# Patient Record
Sex: MALE | Race: BLACK OR AFRICAN AMERICAN | Employment: FULL TIME | URBAN - METROPOLITAN AREA
[De-identification: names, ages, dates, MRNs, and addresses within clinical notes are randomized per-mention and may not be internally consistent; named-entity substitution may affect disease eponyms.]

---

## 2017-06-27 ENCOUNTER — HOSPITAL ENCOUNTER (EMERGENCY)
Age: 37
Discharge: HOME | End: 2017-06-27
Attending: INTERNAL MEDICINE
Payer: SELF-PAY

## 2017-06-27 VITALS
RESPIRATION RATE: 16 BRPM | HEART RATE: 83 BPM | WEIGHT: 315 LBS | TEMPERATURE: 98.5 F | OXYGEN SATURATION: 100 % | SYSTOLIC BLOOD PRESSURE: 146 MMHG | BODY MASS INDEX: 48.16 KG/M2 | DIASTOLIC BLOOD PRESSURE: 93 MMHG

## 2017-06-27 DIAGNOSIS — M76.61 ACHILLES TENDINITIS OF RIGHT LOWER EXTREMITY: Primary | ICD-10-CM

## 2017-06-27 PROCEDURE — 99282 EMERGENCY DEPT VISIT SF MDM: CPT

## 2017-06-27 NOTE — LETTER
NOTIFICATION RETURN TO WORK / SCHOOL 
 
6/27/2017 9:05 PM 
 
Mr. May Hines [address] [place] [id number] To Whom It May Concern: 
 
May Hines is currently under the care of [facility] EMERGENCY DEPT. He will return to work/school on: 06/28/17 If there are questions or concerns please have the patient contact our office.  
 
 
 
Sincerely, 
 
 
 
 
Enmanuel Melvin MD

## 2017-06-28 NOTE — ED NOTES
I have reviewed discharge instructions with patient. Patient verbalized understanding and has no further questions at this time. Education taught and patient verbalized understanding of education. Teach back method used. Patients pain 0/10. Belongings given to patient. Patient discharged with family to home.

## 2017-06-28 NOTE — ED PROVIDER NOTES
HPI Comments: 8:35 PM Josh Rabago is a 43 y.o. male with a hx of asthma who presents to the ED c/o R ankle pain X 3 years, but has progressively gotten worse this past week. Pt states that he tore his achilles heel tendon X 3 years ago. He was seen by an orthopedic surgeon and a x-ray showed that he needed a procedure to repair \"some broken fragments\". He never had the procedure due to insurance complications. Pt states that the pain is causing him difficulty at work. No other associated symptoms or modifying factors at this time. The history is provided by the patient. Past Medical History:   Diagnosis Date    Asthma        History reviewed. No pertinent surgical history. History reviewed. No pertinent family history. Social History     Social History    Marital status: SINGLE     Spouse name: N/A    Number of children: N/A    Years of education: N/A     Occupational History    Not on file. Social History Main Topics    Smoking status: Never Smoker    Smokeless tobacco: Not on file    Alcohol use Not on file    Drug use: Not on file    Sexual activity: Not on file     Other Topics Concern    Not on file     Social History Narrative         ALLERGIES: Review of patient's allergies indicates no known allergies. Review of Systems   Constitutional: Negative for chills and fever. HENT: Negative for congestion and rhinorrhea. Respiratory: Negative for cough and shortness of breath. Cardiovascular: Negative for chest pain and leg swelling. Gastrointestinal: Negative for abdominal pain and nausea. Genitourinary: Negative for dysuria and hematuria. Musculoskeletal: Positive for arthralgias. Negative for myalgias. Skin: Negative for rash and wound. Neurological: Negative for light-headedness and headaches. Psychiatric/Behavioral: Negative for confusion and hallucinations. All other systems reviewed and are negative.       Vitals:    06/27/17 2030   BP: (!) 146/93 Pulse: 83   Resp: 16   Temp: 98.5 °F (36.9 °C)   SpO2: 100%   Weight: (!) 165.6 kg (365 lb)            Physical Exam   Constitutional: He is oriented to person, place, and time. He appears well-developed and well-nourished. No distress. HENT:   Head: Normocephalic and atraumatic. Eyes: No scleral icterus. Cardiovascular: Normal rate. Pulmonary/Chest: Effort normal.   Musculoskeletal:        Right ankle: Tenderness. Coarse callus of the R achilles tendon. There is some tenderness. Normal calf strength. Normal pulse, motor, and sensory    Neurological: He is alert and oriented to person, place, and time. Skin: Skin is warm and dry. Psychiatric: He has a normal mood and affect. Nursing note and vitals reviewed. [facility]  ED Course       Procedures  Vitals:  Patient Vitals for the past 12 hrs:   Temp Pulse Resp BP SpO2   06/27/17 2030 98.5 °F (36.9 °C) 83 16 (!) 146/93 100 %         Medications ordered:   Medications - No data to display      Lab findings:  No results found for this or any previous visit (from the past 12 hour(s)). Progress notes, Consult notes or additional Procedure notes:   8:35 PM I have reassessed the patient and discussed their results and diagnosis. Pt will be discharged in stable condition. Patient is to return to emergency department if any new or worsening condition. Patient understands and verbalizes agreement with plan. Disposition:  Diagnosis:   1.  Achilles tendinitis of right lower extremity        Disposition: Discharged    Follow-up Information     Follow up With Details Comments [phone] Zenaida Gallegos MD Schedule an appointment as soon as possible for a visit Orthopedic follow up [address] [facility] [address].      [facility] EMERGENCY DEPT Go to As needed, If symptoms worsen [address]  [phone]           Patient's Medications   Start Taking    No medications on file   Continue Taking    NAPROXEN SODIUM (ANAPROX DS) 550 MG TABLET    Take 1 Tab by mouth two (2) times daily (with meals). PRN pain   These Medications have changed    No medications on file   Stop Taking    No medications on file     SCRIBE ATTESTATION STATEMENT  Documented by: Pita Meredith for, and in the presence of, James Silva MD 8:34 PM    Signed by: Aida Garza, 06/27/17 8:34 PM    PROVIDER ATTESTATION STATEMENT  I personally performed the services described in the documentation, reviewed the documentation, as recorded by the scribe in my presence, and it accurately and completely records my words and actions.   James Silva MD

## 2017-06-28 NOTE — DISCHARGE INSTRUCTIONS
Tendon Injury (Tendinopathy): Care Instructions  Your Care Instructions  Tendons are tough, flexible tissues that connect muscle to bone. A tendon can hurt or get torn from overuse or aging, especially tendons in the shoulder, elbow, wrist, hip, knee, or ankle. Tendon injuries may be called tendinopathy or tendinitis. Tendon injuries can occur from any motion you have to repeat in a job, sports, or daily activities. Tennis elbow is one common tendon injury. You can treat most tendon problems with over-the-counter pain medicine, rest, changes in your activities, and physical therapy. Follow-up care is a key part of your treatment and safety. Be sure to make and go to all appointments, and call your doctor if you are having problems. Its also a good idea to know your test results and keep a list of the medicines you take. How can you care for yourself at home? · Rest the sore area. You may have to stop doing the activity that caused the tendon pain for a while. · Take an over-the-counter pain medicine, such as acetaminophen (Tylenol), ibuprofen (Advil, Motrin), or naproxen (Aleve). Read and follow all instructions on the label. · Do not take two or more pain medicines at the same time unless the doctor told you to. Many pain medicines have acetaminophen, which is Tylenol. Too much acetaminophen (Tylenol) can be harmful. · Put ice or a cold pack on the sore area for 10 to 20 minutes at a time. Try to do this every 1 to 2 hours for the next 3 days (when you are awake) or until any swelling goes down. Put a thin cloth between the ice and your skin. · Prop up the sore area on a pillow when you ice it or anytime you sit or lie down during the next 3 days. Try to keep it above the level of your heart. This will help reduce swelling.   · Follow your doctor's advice for wearing and caring for a sling, splint, or cast. In some cases, you may wear one of these for a while to help your tendon heal.  · Follow your doctor's advice for stretching and physical therapy. Gently move your joint through its full range of motion. This will prevent stiffness in your joint. · Go back to your activity slowly. Warm up before and stretch after the activity. You also can try making some changes. For example, if a sport caused your tendon pain, alternate the sport with another activity. If using a tool causes pain, switch hands or change your . Stop the activity if it hurts. After the activity, apply ice to prevent pain and swelling. · Do not smoke. Smoking can slow healing. If you need help quitting, talk to your doctor about stop-smoking programs and medicines. These can increase your chances of quitting for good. When should you call for help? Watch closely for changes in your health, and be sure to contact your doctor if:  · Your pain gets worse. · You do not get better as expected. Where can you learn more? Go to http://radha-michelle.info/. Enter A157 in the search box to learn more about \"Tendon Injury (Tendinopathy): Care Instructions. \"  Current as of: March 21, 2017  Content Version: 11.3  © 4139-6161 "[employer]. Care instructions adapted under license by [employer] (which disclaims liability or warranty for this information). If you have questions about a medical condition or this instruction, always ask your healthcare professional. [address] any warranty or liability for your use of this information.

## 2019-04-01 ENCOUNTER — OFFICE VISIT (OUTPATIENT)
Dept: ORTHOPEDIC SURGERY | Age: 39
End: 2019-04-01

## 2019-04-01 VITALS
TEMPERATURE: 98.2 F | WEIGHT: 315 LBS | OXYGEN SATURATION: 98 % | SYSTOLIC BLOOD PRESSURE: 141 MMHG | BODY MASS INDEX: 41.75 KG/M2 | HEART RATE: 70 BPM | DIASTOLIC BLOOD PRESSURE: 85 MMHG | RESPIRATION RATE: 16 BRPM | HEIGHT: 73 IN

## 2019-04-01 DIAGNOSIS — M22.2X1 PATELLOFEMORAL DISORDER, RIGHT: Primary | ICD-10-CM

## 2019-04-01 PROBLEM — E66.01 OBESITY, MORBID (HCC): Status: ACTIVE | Noted: 2019-04-01

## 2019-04-01 RX ORDER — MELOXICAM 15 MG/1
TABLET ORAL
Qty: 90 TAB | Refills: 0 | Status: SHIPPED | OUTPATIENT
Start: 2019-04-01

## 2019-04-01 NOTE — PROGRESS NOTES
HISTORY OF PRESENT ILLNESS    Judge Quevedo is a 34y.o. year old male comes in today as new patient for: right knee pain    Patients symptoms have been present for 8+ years. Pain level 6/10 anterior, It has slightly improved with ER visits prior. Patient has tried:  Naproxen w/o benefit. It is described as pain and swelling in knee for years w/o known cause. IMAGING: XR right knee today minimal degen changes per my review. History reviewed. No pertinent surgical history. Social History     Socioeconomic History    Marital status: UNKNOWN     Spouse name: Not on file    Number of children: Not on file    Years of education: Not on file    Highest education level: Not on file   Tobacco Use    Smoking status: Never Smoker    Smokeless tobacco: Never Used      Current Outpatient Medications   Medication Sig Dispense Refill    naproxen sodium (ANAPROX DS) 550 mg tablet Take 1 Tab by mouth two (2) times daily (with meals). PRN pain 12 Tab 0     Past Medical History:   Diagnosis Date    Asthma      History reviewed. No pertinent family history. ROS:  Some swell, no bruise. , numb. All other systems reviewed and negative aside from that written in the HPI. Objective:  /85 (BP 1 Location: Left arm, BP Patient Position: Sitting)   Pulse 70   Temp 98.2 °F (36.8 °C) (Oral)   Resp 16   Ht 6' 1\" (1.854 m)   Wt (!) 372 lb 9.6 oz (169 kg)   SpO2 98%   BMI 49.16 kg/m²   GEN: Appears stated age in NAD. HEAD:  Normocephalic, atraumatic. NEURO:  Sensation intact light touch B/L lower extremities. MS:  LE Strength +5/5 bilateral .   right Knee:  1+ Effusion . Lachman negative. Valgus negative. Varus negative. negative joint line tenderness medial, lateral.  Romaine negative. Posterior drawer negative. Noble compression test negative. Patellar apprehension negative. Patellar facet  positive tender to palpation medial minimal crepitance right.   Pes anserine negative TTP right  EXT: no clubbing/cyanosis. no edema. SKIN: Warm/dry without rash. HEENT: Conjunctiva/lids WNL. External canals/nares WNL. Tongue midline. PERRL, EOMI. Hearing intact. NECK: Trachea midline. Supple, Full ROM. No thyromegaly. CARDIAC: No edema. LUNGS: Normal effort. ABD: Soft, no masses. No HSM. PSYCH: A+O x3. Appropriate judgment and insight. Assessment/Plan:   Diagnoses and all orders for this visit:    1. Patellofemoral disorder, right  -     XR KNEE RT MAX 2 VWS; Future  -     meloxicam (MOBIC) 15 mg tablet; Take 1 tab daily as needed pain with food. Patient (or guardian if minor) verbalizes understanding of evaluation and plan. Will start HEP as declines PT now and use mobic and ice PRn and RTC 6 weeks.

## 2020-07-13 ENCOUNTER — HOSPITAL ENCOUNTER (OUTPATIENT)
Dept: PHYSICAL THERAPY | Age: 40
Discharge: HOME | End: 2020-07-13
Payer: MEDICAID

## 2020-07-13 PROCEDURE — 97162 PT EVAL MOD COMPLEX 30 MIN: CPT

## 2020-07-13 NOTE — PROGRESS NOTES
PT DAILY TREATMENT NOTE 10-18    Patient Name: Bryson Burton  Date:2020  : 1980  [x]  Patient  Verified  Payor: [facility] MEDICAID / Plan: [facility] [address] [facility] CCCP / Product Type: Managed Care Medicaid /    In time:1:00  Out time:145  Total Treatment Time (min): 45  Visit #: 1 of     Medicare/BCBS Only   Total Timed Codes (min):  na 1:1 Treatment Time:  na       Treatment Area: Left wrist pain [M25.532]  Right knee pain [M25.561]  Right ankle pain [M25.571]    SUBJECTIVE  Pain Level (0-10 scale): 5  Any medication changes, allergies to medications, adverse drug reactions, diagnosis change, or new procedure performed?: [x] No    [] Yes (see summary sheet for update)  Subjective functional status/changes:   [] No changes reported  Pt IE today see POC for details    OBJECTIVE    Modality rationale: decrease inflammation and decrease pain to improve the patients ability to tolerate functional mobility   Min Type Additional Details    [] Estim:  []Unatt       []IFC  []Premod                        []Other:  []w/ice   []w/heat  Position:  Location:    [] Estim: []Att    []TENS instruct  []NMES                    []Other:  []w/US   []w/ice   []w/heat  Position:  Location:    []  Traction: [] Cervical       []Lumbar                       [] Prone          []Supine                       []Intermittent   []Continuous Lbs:  [] before manual  [] after manual    []  Ultrasound: []Continuous   [] Pulsed                           []1MHz   []3MHz W/cm2:  Location:    []  Iontophoresis with dexamethasone         Location: [] Take home patch   [] In clinic   PD []  Ice     []  heat  []  Ice massage  []  Laser   []  Anodyne Position:  Location:    []  Laser with stim  []  Other:  Position:  Location:    []  Vasopneumatic Device Pressure:       [] lo [] med [] hi   Temperature: [] lo [] med [] hi   [] Skin assessment post-treatment:  []intact []redness- no adverse reaction    []redness  adverse reaction:     30 min [x]Eval                  []Re-Eval       15 NC min Therapeutic Exercise:  [] See flow sheet :   Rationale: increase ROM and increase strength to improve the patients ability to walk, squat, stand, and             With   [] TE   [] TA   [] neuro   [] other: Patient Education: [x] Review HEP    [] Progressed/Changed HEP based on:   [] positioning   [] body mechanics   [] transfers   [] heat/ice application    [] other:      Other Objective/Functional Measures:    Justification for Eval Code Complexity:  Patient History : see POC   Examination see exam   Clinical Presentation: evolving  Clinical Decision Making : FOTO : knee 49/100 wrist 48/100    Pain Level (0-10 scale) post treatment: 5    ASSESSMENT/Changes in Function: Pt was instructed in initial HEP required demo, vc, and tc for all exercises to perform correctly. Pt was given hand out detailing exercises and instructed in modification of exercises to tolerance, and in performing exercises safely. Pt verbalized understanding. Patient will continue to benefit from skilled PT services to modify and progress therapeutic interventions, address functional mobility deficits, address ROM deficits, address strength deficits, analyze and address soft tissue restrictions, analyze and cue movement patterns, analyze and modify body mechanics/ergonomics, assess and modify postural abnormalities, address imbalance/dizziness and instruct in home and community integration to attain remaining goals.      [x]  See Plan of Care  []  See progress note/recertification  []  See Discharge Summary         Progress towards goals / Updated goals:  No progress as goals were set today    PLAN  [x]  Upgrade activities as tolerated     [x]  Continue plan of care  []  Update interventions per flow sheet       []  Discharge due to:_  []  Other:_      Judy Santana 7/13/2020  8:39 AM    Future Appointments   Date Time Provider Helen Phillips   7/13/2020  1:00 PM [address] [place] [address] [facility]

## 2020-07-13 NOTE — PROGRESS NOTES
[facility] PHYSICAL THERAPY AT [address] [place], [place] [address]  Phone: [phone] Fax: [phone] / [address] [place] [address] PHYSICAL THERAPY SERVICES  Patient Name: Deniz Sampson : 1980   Medical   Diagnosis: Left wrist pain [M25.532]  Right knee pain [M25.561]  Right ankle pain [M25.571] Treatment Diagnosis: Left wrist pain [M25.532]  Right knee pain [M25.561]  Right ankle pain [M25.571]   Onset Date: 5+ years ago knee and ankle, 5 months ago wrist     Referral Source: Nikole Beth* Start of Care [facility]): 2020   Prior Hospitalization: See medical history Provider #: [id number]   Prior Level of Function: Able to work, hold soda can, walk, stand, and squat without modification or restriction. Comorbidities: Asthma, arthritis, HTN   Medications: Verified on Patient Summary List   The Plan of Care and following information is based on the information from the initial evaluation.   ========================================================================  Assessment / key information:  Pt is a 41y.o. year old M who presents with R ankle, R knee, and L wrist pain. Current deficits include: dec ankle and knee ROM, dec LE strength, dec flexibility in calf, hamstring, and quadriceps/hip flexors as well as dec L wrist strength and ROM Functional deficits include: difficulty walking, standing, squatting, gripping, lifting, and carrying. Gonzalo Plaster FOTO score indicates approximately 51% functional impairment. Home exercise program initiated on initial evaluation to address these deficits. Pt would benefit from PT to address these deficits for increased functional mobility and QOL. TANISHA: Pt reports that his knee and achilles have been bothering him for years. He has seen several doctors and gone to the ER over the years about his knee. He also did therapy for his knee late last year but it didn't seem to help.  He feels a lot of the injuries are due to wear and tear and doing heavy labor jobs for many years. He reports that he tore his achilles several years ago, but did not do surgery due to not having insurance. He tore it while walking and overstepped a curb and lost his balance when he felt it pop and it was incredibly painful. He was splinted for several months, and its hurt ever since. He reports that the most recent xrays showed heel spurs in his ankles. He has also had a cortisone shot in it several years ago but it did not help. His knees have hurt and gotten swollen for years too. He has had fluid drawn off the knee before. He denies any prior injury to the knee. He injured his wrist about 5 months ago while working. He was picking up two cases of vegetables when he felt it pop and he lost his  on the cases. It didn't hurt too bad initially, but by the next day was very swollen and painful. He saw his MD who told him he had arthritis in his hands and a torn ligament in his wrist. He was given a brace for it that he wore for about a month. He describes the pain in his feet an ankle as \"pain and stiffness, feel like nail\". The pain in his knees and ankle is worse with prolonged sitting, and prolonged activity. The wrist is worse with lifting, gripping, and carrying. He will lose his  after about 2 mins on a soda can. He also has pain with opening doors. Resting and light activity help with with the pain. He reports some numbness and tingling in the bottom of his R foot that is constant that started with the achilles injury. The pain in his leg gives him difficulty walking and standing, and is limited to about 5 mins walking/standing, squatting is also difficult and painful. OBJECTIVE:  PAIN:  Location- R achilles, R knee, L wrist  Current- 5/10  Best- 3/10  Worst- 10/10  Alleviating factors: rest, light activity, stretching  Aggravating factors: prolonged positioning, inc activity.      MMT:  Hip   - flex L=3/5 R=3/5  - ext L=3/5 R=3/5  - abdL=3/5 R=3-/5  Knee   - flex L=5/5 R=3/5!  - ext L=5/5 R=3/5! Ankle  - DF L=5/5 R=5/5  - PF L=3/5 R=3/5     strength L= 14 lb, R= 34 lb     AROM:   R Hip  Flexion- R= 60 deg SLR  Extension- R= 5 deg! R Knee  Flexion- 95 deg! Extension- -5 deg  R Ankle  PF 55  DF -5  INV 20  MAU 10  L wrist  Flex 65  Ext 45  Radial deviation 20  Ulnar deviation 20    Outcome Measure: Hand/wrist FOTO= 48              Knee FOTO=49  Accessory ROM: dec posterior talar glide, dec superior and inferior patellar glide    Swelling: Point swelling along inferior lateral patellar border    Palpation: TTP to patellar apex, patellar tendon, achilles tendon, and flexor carpi ulnaris.      Special Tests:  - Posterior Drawer negative  - Varus stress test negative  - Valgus stress test negative  - Asif's sign test positive            ========================================================================  Eval Complexity: History: HIGH Complexity :3+ comorbidities / personal factors will impact the outcome/ POC Exam:MEDIUM Complexity : 3 Standardized tests and measures addressing body structure, function, activity limitation and / or participation in recreation  Presentation: MEDIUM Complexity : Evolving with changing characteristics  Clinical Decision Making:MEDIUM Complexity : FOTO score of 26-74Overall Complexity:MEDIUM  Problem List: pain affecting function, decrease ROM, decrease strength, edema affecting function, impaired gait/ balance, decrease ADL/ functional abilitiies, decrease activity tolerance, decrease flexibility/ joint mobility and decrease transfer abilities   Treatment Plan may include any combination of the following: Therapeutic exercise, Therapeutic activities, Neuromuscular re-education, Physical agent/modality, Gait/balance training, Manual therapy, Patient education, Self Care training, Functional mobility training, Home safety training and Stair training  Patient / Family readiness to learn indicated by: asking questions  Persons(s) to be included in education: patient (P)  Barriers to Learning/Limitations: None  Measures taken:    Patient Goal (s): \"less pain\"   Patient self reported health status: fair  Rehabilitation Potential: fair  Short Term Goals: To be accomplished in 1 weeks:  1) Goal: Patient will be independent and compliant with HEP in order to progress toward long term goals. Status at last note/certification: issued and reviewed  Long Term Goals: To be accomplished in 8-12 treatments:  1) Goal: Patient will improve FOTO assessment scores to >= 59 pts in order to indicate improved functional abilities. Status at last note/certification: WCUFZ=41 pts, knee =49 pts  2) Goal: Patient will improve R hip extension to 15 deg for improved  gait mechanics  Status at last note/certification: 5 deg  3)Goal: Patient will improve R ankle DF to 15 deg, and knee flexion to 120 deg or better for functional squats   Status at last note/certification: R DF=-5 deg, R knee flex= 95 deg  4) Goal: Patient will report overall at least 65% improvement in function in order to progress toward premorbid status. Status at last note/certification: n/a  5) Goal: Patient will report ability to walk/stand for 30 mins to buy groceries  Status at last note/certification: 5 mins  5) Goal: Patient will demo L  strength = R  strength for ADLs  Status at last note/certification: L= 14 lb, R= 34 lb  Frequency / Duration:   Patient to be seen  1-2  times per week for 4-6  weeks:  Patient / Caregiver education and instruction: exercises  Therapist Signature: Elan Rust Date: 5/00/2306   Certification Period: na Time: 8:37 AM   ========================================================================  I certify that the above Physical Therapy Services are being furnished while the patient is under my care. I agree with the treatment plan and certify that this therapy is necessary.   Physician Signature: Date:       Time:   Please sign and return to [facility] or you may fax the signed copy to [phone]. Thank you.

## 2020-07-22 ENCOUNTER — HOSPITAL ENCOUNTER (OUTPATIENT)
Dept: PHYSICAL THERAPY | Age: 40
Discharge: HOME | End: 2020-07-22
Payer: MEDICAID

## 2020-07-22 PROCEDURE — 97110 THERAPEUTIC EXERCISES: CPT

## 2020-07-22 PROCEDURE — 97116 GAIT TRAINING THERAPY: CPT

## 2020-07-22 PROCEDURE — 97530 THERAPEUTIC ACTIVITIES: CPT

## 2020-07-29 ENCOUNTER — APPOINTMENT (OUTPATIENT)
Dept: PHYSICAL THERAPY | Age: 40
End: 2020-07-29
Payer: MEDICAID

## 2020-08-05 ENCOUNTER — APPOINTMENT (OUTPATIENT)
Dept: PHYSICAL THERAPY | Age: 40
End: 2020-08-05
Payer: MEDICAID

## 2020-08-10 ENCOUNTER — HOSPITAL ENCOUNTER (OUTPATIENT)
Dept: PHYSICAL THERAPY | Age: 40
Discharge: HOME | End: 2020-08-10
Payer: MEDICAID

## 2020-08-10 PROCEDURE — 97110 THERAPEUTIC EXERCISES: CPT

## 2020-08-10 PROCEDURE — 97116 GAIT TRAINING THERAPY: CPT

## 2020-08-10 PROCEDURE — 97530 THERAPEUTIC ACTIVITIES: CPT

## 2020-08-10 NOTE — PROGRESS NOTES
PT DAILY TREATMENT NOTE    Patient Name: Kimberley Oviedo  Date:8/10/2020  : 1980  [x]  Patient  Verified  Payor: Neil Orellana / Plan: [facility] [address] [facility] CCCP / Product Type: Managed Care Medicaid /    In time: 12:17 PM  Out time: 1:00 PM  Total Treatment Time (min): 43  Visit #: 3 of     Treatment Area: Left wrist pain [M25.532]  Right knee pain [M25.561]  Right ankle pain [M25.571]    SUBJECTIVE  Pain Level (0-10 scale): 5/10 Location: L wrist, R knee/ankle  Any medication changes, allergies to medications, adverse drug reactions, diagnosis change, or new procedure performed?: [x] No [] Yes (see summary sheet for update)  Subjective functional status/changes: Pt reports he tore a mm in his arm lifting a 170 lb speaker box with only his left arm. He felt it pop and was in immediate pain. He saw his MD and was diagnosed with a tear in his arm mm and is being referred to an ortho for further evaluation, he is calling to set up the appointment today.    OBJECTIVE  Modality rationale: decrease edema, decrease inflammation and decrease pain to improve the patients ability to progress to functional activities limited by pain or other dysfunction   Min Type Additional Details    [] Estim:   [] IFC   [] Premod   [] NMES   [] Other:     []Att   []Unatt  []TENS instruction Position:   Location:   []w/US   []w/ice   []w/heat    [] Traction: [] Cervical   []Lumbar   [] Prone   []Supine    [] Intermittent   [] Continuous Lbs:   [] before manual  [] after manual    [] Ultrasound   []Continuous   [] Pulsed    Ultrasound Depth: []1MHz   []3MHz Location:   W/cm2:     []  Iontophoresis with dexamethasone Location:   [] Take Home Patch   [] In clinic   PD [x] Ice   [] Heat   [] Ice massage Position: supine H/L  Location: L wrist, R knee, R ankle    []  Vasopneumatic Device Pressure: [] lo [] med [] hi   Temp:    [x] Skin assessment post-treatment:   [x]Intact   []redness- no adverse reaction   []redness  adverse reaction:       19 min Therapeutic Exercise: [] See Flow Sheet   Rationale: increase ROM, increase strength, improve coordination, improve balance, and increase proprioception to improve the patients ability to progress to functional activities limited by pain or other dysfunction     16 min Therapeutic Activity: [] See Flow Sheet   Rationale: to improve functional activities, model real life movements and performance specific to the patients need with supervision to return the patient to their PLOF      8 min Gait Training: [x] See Flow Sheet   Rationale: facilitate gait in all varieties including dynamic movements to improve gait quality in the home and community        X throughout treatment min Patient Education: [x] Review HEP       Other Objective/Functional Measures: Added heel raise, grapevine, lateral cleveland steps, VMO LAQ,   Pain Level (0-10 scale) post treatment: 4.5/10 Location: R knee ankle    ASSESSMENT:  All UE exercises were held today due to Pt report of mm tear in UE. Focus of tx today was on LE program. Pt was able to tolerate new activities without aggravating knee/ankle pain. Pt required vc and demo for all new exercises to perform correctly, he reported only mm fatigue with new LE exercises. With standing exercises pt was instructed to utilize R UE to hold parallel bars/chair for balance, and to avoid utilizing L UE due to recent injury. Pt edu re mm/ tendon tears, and that until he is further evaluated by MD best course of action would be to rest arm and avoid lifting/carrying to avoid further injury. Pt verbalized understanding.      Patient will continue to benefit from skilled PT services to modify and progress therapeutic interventions, address functional mobility deficits, address ROM deficits, address strength deficits, analyze and address soft tissue restrictions, analyze and cue movement patterns, analyze and modify body mechanics/ergonomics, assess and modify postural abnormalities, address imbalance/dizziness, and instruct in home and community integration to attain remaining goals. []  See Plan of Care  []  See Progress Note/Re-certification  []  See Discharge Summary      Progress towards goals / Updated goals: To be accomplished in 1 weeks per POC 7-13-20:  1) Goal: Patient will be independent and compliant with HEP in order to progress toward long term goals.  In progress 7-22-20     PLAN  [x] Upgrade activities as tolerated   [x] Continue plan of care  [] Update interventions per flow sheet  [] Discharge due to:   [] Other:     Ervin Rodriguez 8/10/2020 2:37 PM

## 2020-08-12 ENCOUNTER — APPOINTMENT (OUTPATIENT)
Dept: PHYSICAL THERAPY | Age: 40
End: 2020-08-12
Payer: MEDICAID

## 2020-08-17 ENCOUNTER — HOSPITAL ENCOUNTER (OUTPATIENT)
Dept: PHYSICAL THERAPY | Age: 40
Discharge: HOME | End: 2020-08-17
Payer: MEDICAID

## 2020-08-19 ENCOUNTER — APPOINTMENT (OUTPATIENT)
Dept: PHYSICAL THERAPY | Age: 40
End: 2020-08-19
Payer: MEDICAID

## 2020-09-25 NOTE — PROGRESS NOTES
[address] [facility] [address] [facility] AT [address]. [address], [place], [place]  Phone: [phone] Fax 21 [phone] SUMMARY  Patient Name: Viola Welch : 1980   Treatment/Medical Diagnosis: Left wrist pain [M25.532]  Right knee pain [M25.561]  Right ankle pain [M25.571]   Referral Source: Elier aMrrero     Date of Initial Visit: 20 Attended Visits: 3 Missed Visits: 4     SUMMARY OF TREATMENT  Pt attended initial evaluation and 2 follow-up appointments. Unfortunately, patient failed to return for further treatment and is therefore discharged from our care at this time. A formal reassessment of goals was unable to be performed due to unplanned DC. RECOMMENDATIONS  Discontinue physical therapy due to patient not returning. RECOMMENDATIONS  Discontinue therapy due to lack of attendance or compliance. If you have any questions/comments please contact us directly at [phone]. Thank you for allowing us to assist in the care of your patient. Therapist Signature: Maren Frankel Date: 20   Reporting Period: na Time: 12:42 PM     NOTE TO PHYSICIAN:  Your patient's insurance requires this discharge note be signed and returned. PLEASE COMPLETE THE ORDERS BELOW AND RETURN TO:  [facility] @ [phone]    ___ I have read the above report and request that my patient be discharged from therapy.      Physician Signature:        Date:       Time:

## 2020-10-15 ENCOUNTER — TELEPHONE (OUTPATIENT)
Dept: ORTHOPEDIC SURGERY | Age: 40
End: 2020-10-15

## 2020-10-15 NOTE — TELEPHONE ENCOUNTER
Called Pt to confirm 10/19 appt w/ Dr. Declan Guerrero. Pt would like to reschedule to later in the afternoon. Pt advised no openings same day, but will have PSR call back to reschedule Pt.

## 2020-11-02 ENCOUNTER — OFFICE VISIT (OUTPATIENT)
Dept: ORTHOPEDIC SURGERY | Age: 40
End: 2020-11-02
Payer: MEDICAID

## 2020-11-02 VITALS
RESPIRATION RATE: 15 BRPM | SYSTOLIC BLOOD PRESSURE: 142 MMHG | BODY MASS INDEX: 41.75 KG/M2 | HEIGHT: 73 IN | HEART RATE: 93 BPM | WEIGHT: 315 LBS | TEMPERATURE: 98.1 F | DIASTOLIC BLOOD PRESSURE: 84 MMHG

## 2020-11-02 DIAGNOSIS — S46.212A SPRAIN, BICEP, LEFT, INITIAL ENCOUNTER: Primary | ICD-10-CM
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
## 2020-11-02 PROCEDURE — 99214 OFFICE O/P EST MOD 30 MIN: CPT | Performed by: FAMILY MEDICINE

## 2020-11-02 RX ORDER — DICLOFENAC SODIUM 50 MG/1
50 TABLET, DELAYED RELEASE ORAL 2 TIMES DAILY WITH MEALS
Qty: 180 TAB | Refills: 0 | Status: SHIPPED | OUTPATIENT
Start: 2020-11-02

## 2020-11-02 RX ORDER — ALLOPURINOL 100 MG/1
100 TABLET ORAL DAILY
COMMUNITY
Start: 2020-10-24

## 2020-11-02 RX ORDER — DULOXETIN HYDROCHLORIDE 60 MG/1
CAPSULE, DELAYED RELEASE ORAL
COMMUNITY
Start: 2020-02-28

## 2020-11-02 RX ORDER — AMLODIPINE BESYLATE 10 MG/1
TABLET ORAL
COMMUNITY
Start: 2020-09-23

## 2020-11-02 RX ORDER — SPIRONOLACTONE 25 MG/1
25 TABLET ORAL DAILY
COMMUNITY
Start: 2020-03-06

## 2020-11-02 RX ORDER — PREDNISONE 20 MG/1
TABLET ORAL
COMMUNITY
Start: 2019-09-22 | End: 2020-11-03

## 2020-11-02 RX ORDER — ERGOCALCIFEROL 1.25 MG/1
CAPSULE ORAL
COMMUNITY
Start: 2019-08-04

## 2020-11-02 RX ORDER — INDAPAMIDE 2.5 MG/1
2.5 TABLET, FILM COATED ORAL DAILY
COMMUNITY
Start: 2020-03-11

## 2020-11-02 RX ORDER — DICLOFENAC SODIUM 75 MG/1
TABLET, DELAYED RELEASE ORAL
COMMUNITY
End: 2020-11-02 | Stop reason: DRUGHIGH

## 2020-11-02 RX ORDER — COLCHICINE 0.6 MG/1
0.6 CAPSULE ORAL DAILY
COMMUNITY
Start: 2020-04-29

## 2020-11-02 NOTE — PATIENT INSTRUCTIONS
Perform stretches daily, use medication as prescribed, work within restrictions, and return to the office as needed. ATTENTION: 
Effective 12/15/2020 Dr. Zenia Menendez will no longer be at [facility] in [place] and will transition completely to his other office near [facility] at [address], [facility]. The office phone number is still [phone]. Please reach out via [email or web address] or telephone for any appointment requests or questions you may have. So sorry for the inconvenience, but we hope to be able to continue providing quality care to you despite the move. Biceps stretch 1. Stand and hold your affected arm out to the side, with your hand at about hip level. 2. Gently bend your wrist back so that your fingers point down toward the floor. 3. You may also do this next to a wall and rest your fingers on the wall. 4. For more of a stretch, bend your head to the opposite side of your affected arm. 5. Hold for 15 to 30 seconds. 6. Repeat 2 to 4 times. Resisted supination For this and the following exercises, you will need elastic exercise material, such as surgical tubing or Thera-Band. 1. Sit leaning forward with your legs slightly spread. Then place your forearm on your thigh with your hand and affected wrist in front of your knee. 2. Grasp one end of an exercise band with your palm down, and step on the other end. 3. Keeping your wrist straight, roll your palm outward and away from your thigh for a count of 2, then slowly move your wrist back to the starting position to a count of 5. 
4. Repeat 8 to 12 times. Resisted elbow flexion 1. Using your affected arm, hold one end of an elastic band in your hand. 2. Place the other end of the band under your foot on the same side of your body as your affected arm. 3. Slowly bend your elbow and bring your hand toward your shoulder.  Your palm and the underside of your wrist should be facing up as you pull the band toward your shoulder. Count to 2 as you pull up. 4. Relax and slowly return to your starting position. Count to 5 as you return to the start. 5. Repeat 8 to 12 times. Resisted elbow flexion at shoulder level 1. Tie the ends of an exercise band together to form a loop. Attach one end of the loop to a secure object or shut a door on it to hold it in place. (Or you can have someone hold one end of the loop to provide resistance.) The band should be at shoulder level. 2. Stand facing where you have tied or fastened the band. 3. Start with your affected arm held out straight, holding the band in your hand. 4. Slowly bend your elbow to 90 degrees, bringing your hand toward your forehead. Count to 2 as you pull the band toward your head. 5. Relax and slowly return to your starting position. Count to 5 as you return to the start. 6. Repeat 8 to 12 times. Follow-up care is a key part of your treatment and safety. Be sure to make and go to all appointments, and call your doctor if you are having problems. It's also a good idea to know your test results and keep a list of the medicines you take. Where can you learn more? Go to http://www.gray.com/ Enter U787 in the search box to learn more about \"Biceps Tendinitis: Exercises. \" Current as of: March 2, 2020               Content Version: 12.6 © 6853-4365 [employer]. Care instructions adapted under license by [employer] (which disclaims liability or warranty for this information). If you have questions about a medical condition or this instruction, always ask your healthcare professional. [address] any warranty or liability for your use of this information.

## 2020-11-02 NOTE — PROGRESS NOTES
HISTORY OF PRESENT ILLNESS    Wade Moscoso 1980 is a 41y.o. year old male comes in today to be evaluated and treated for: left arm pain    Has had pain left bicep for about 3 months after picking up box (not at work) and tearing feeling in arm. Pain level 6/10. Using naproxen, norco from ER 8/3/2020 prior with benefit. Has knot there still toward elbow medial.    History reviewed. No pertinent surgical history. Social History     Socioeconomic History    Marital status: UNKNOWN     Spouse name: Not on file    Number of children: Not on file    Years of education: Not on file    Highest education level: Not on file   Tobacco Use    Smoking status: Never Smoker    Smokeless tobacco: Never Used     Current Outpatient Medications   Medication Sig Dispense Refill    allopurinoL (ZYLOPRIM) 100 mg tablet Take 100 mg by mouth daily.  colchicine (MITIGARE) 0.6 mg capsule Take 0.6 mg by mouth daily.  predniSONE (DELTASONE) 20 mg tablet Take  by mouth.  amLODIPine (NORVASC) 10 mg tablet TAKE ONE TABLET BY MOUTH ONCE DAILY      diclofenac EC (VOLTAREN) 75 mg EC tablet 1 tablet with food or milk as needed      DULoxetine (Cymbalta) 60 mg capsule 1 capsule      ergocalciferol (ERGOCALCIFEROL) 1,250 mcg (50,000 unit) capsule       indapamide (LOZOL) 2.5 mg tablet Take 2.5 mg by mouth daily.  spironolactone (ALDACTONE) 25 mg tablet Take 25 mg by mouth daily.  naproxen sodium (ANAPROX DS) 550 mg tablet Take 1 Tab by mouth two (2) times daily (with meals). PRN pain 12 Tab 0    meloxicam (MOBIC) 15 mg tablet Take 1 tab daily as needed pain with food. 80 Tab 0     Past Medical History:   Diagnosis Date    Asthma     Hypertension      History reviewed. No pertinent family history. ROS:  + knot, bruise left arm.     Objective:  BP (!) 142/84   Pulse 93   Temp 98.1 °F (36.7 °C)   Resp 15   Ht 6' 1\" (1.854 m)   Wt (!) 390 lb (176.9 kg)   BMI 51.45 kg/m²   GEN:  Appears stated age in NAD.  HEAD:  Normocephalic, Atraumatic. NEURO:  Sensation intact light touch upper and lower extremities. Biceps & Triceps reflexes +2/4 bilaterally. right hand dominant. M/S:  left elbow/wrist: Positive TTP brachialis/medial biceps, worse with resisted elbow flexion>supination. Negative alejandra tenderness. Phalen's negative. Tinel's negative. Strength +5/5 bilateral .  Piano key sign Negative bilateral .  Carpal bone motion normal.  Finklestein's negative  TFCC Load Test negative. BILATERAL neither epicondyle(s) without TTP not changed with wrist extension. negative muscular atrophy. EXT:  no clubbing/cyanosis. no edema. Assessment/Plan:     ICD-10-CM ICD-9-CM    1. Sprain, bicep, left, initial encounter  S46.212A 840.8 diclofenac EC (VOLTAREN) 50 mg EC tablet   2. Work related injury  Y99.0 959.9      Time with Pt 43 minutes, >50% of which was counseling pt regarding Dx and Tx options and coordination of care. Patient (or guardian if minor) verbalizes understanding of evaluation and plan. Will Rx voltaren 50mg for PRN and limit lifting to 25#. Declines MRI (claustrophobia) and unlikely surgical candidate as 3+ months since injury and not complete rupture.

## 2020-11-02 NOTE — PROGRESS NOTES
AVS reviewed: YES  HEP: AT demonstrated  Resources Provided: YES Printed Photos & work letter  Patient questions/concerns answered: NO. Pt denied questions/concerns  Patient verbalized understanding of treatment plan: YES

## 2021-09-21 ENCOUNTER — APPOINTMENT (OUTPATIENT)
Dept: NUTRITION | Age: 41
End: 2021-09-21

## 2021-10-21 ENCOUNTER — APPOINTMENT (OUTPATIENT)
Dept: NUTRITION | Age: 41
End: 2021-10-21

## 2021-10-26 ENCOUNTER — HOSPITAL ENCOUNTER (OUTPATIENT)
Dept: NUTRITION | Age: 41
Discharge: HOME | End: 2021-10-26
Payer: MEDICAID

## 2021-10-26 PROCEDURE — 97802 MEDICAL NUTRITION INDIV IN: CPT

## 2021-10-26 NOTE — PROGRESS NOTES
[address] [place] [address], [address] [place] [address] Road  Phone: [phone] Fax: [phone]   Nutrition Assessment  Medical Nutrition Therapy   Outpatient Initial Evaluation         Patient Name: Alysia Wagner : 1980   Treatment Diagnosis: Morbid obesity due to excess calories    Referral Source: Cari Marroquin MD [facility]): 10/26/2021     Gender: male Age: 35 y.o. Ht: 73 in Wt:  390 lb  kg   BMI: 46 BMR   Male  BMR Female      Past Medical History:  High Blood Pressure      Pertinent Medications:        Biochemical Data:   No results found for: HBA1C, DGX1JEQT, VBE0MTNO  No results found for: NA, K, CL, CO2, AGAP, GLU, BUN, CREA, BUCR, GFRAA, GFRNA, CA, TBIL, TBILI, AP, TP, ALB, GLOB, AGRAT, ALT, AST  No results found for: CHOL, CHOLPOCT, CHOLX, CHLST, CHOLV, HDL, HDLPOC, HDLP, LDL, LDLCPOC, LDLC, DLDLP, VLDLC, VLDL, TGLX, TRIGL, TRIGP, TGLPOCT, CHHD, CHHDX  No results found for: ALT, AST, GGT, GGTP, AP, APIT, APX, CBIL, TBIL, TBILI  No results found for: SHASTA, CREAPOC, ACREA, CREA, REFC3, REFC4  No results found for: BUN, BUNPOC, IBUN, MBUNV, BUNV  No results found for: MCACR, MCA1, MCA2, MCA3, MCAU, MCAU2, MCALPOCT     Assessment:           Food & Nutrition:        Estimate Needs   Calories:   Protein:  Carbs:   Fat:    Kcal/day  g/day  g/day  g/day        percent: 25  39  30               Nutrition Diagnosis        Nutrition Intervention &  Education:    Handouts Provided: []  Carbohydrates  []  Protein  []  Non-starchy Vegatbles  []  Food Label  []  Meal and Snack Ideas  []  Food Journals []  Diabetes  []  Cholesterol  []  Sodium  []  Gen Nutr Guidelines  []  SBGM Guidelines  []  Others:   Information Reviewed with: Patient    Readiness to Change Stage: []  Pre-contemplative    []  Contemplative  []  Preparation               []  Action                  []  Maintenance   Potential Barriers to Learning: []  Decline in memory    []  Language barrier   []  Other:  []  Emotional                  []  Limited mobility  []  Lack of motivation     [] Vision, hearing or cognitive impairment   Expected Compliance: Good / Christy Levine / Poor due to     Nutritional Goal - To promote lifestyle changes to result in:    []  Weight loss  []  Improved diabetic control  []  Decreased cholesterol levels  []  Decreased blood pressure  []  Weight maintenance []  Preventing any interactions associated with food allergies  []  Adequate weight gain toward goal weight  []  Other:        Patient Goals:   1. Patient will consume three meals per day 4-5 hours apart. 2. Patient will include a protein at all meals and snacks. 3. Patient will drink 64 ounces of water daily.       Dietitian Signature: Teri Delong RD Date: 10/26/2021   Follow-up:  Time: 10:35 AM

## 2022-01-28 ENCOUNTER — TRANSCRIBE ORDER (OUTPATIENT)
Dept: SCHEDULING | Age: 42
End: 2022-01-28

## 2022-01-28 DIAGNOSIS — S86.011S RUPTURE OF RIGHT ACHILLES TENDON, SEQUELA: Primary | ICD-10-CM

## 2022-01-28 DIAGNOSIS — M77.31 HEEL SPUR, RIGHT: ICD-10-CM

## 2022-02-10 ENCOUNTER — DOCUMENTATION ONLY (OUTPATIENT)
Dept: PULMONOLOGY | Age: 42
End: 2022-02-10

## 2022-02-10 NOTE — PROGRESS NOTES
Left message for pt to set up new patient appt. Hx of asthma, sob, cough, hx of pna. Covid positive 1/8/22. CXR 1/8/22 @ [facility]. Images will need to be pushed. Ref in filing cabinet.

## 2022-03-19 PROBLEM — E66.01 OBESITY, MORBID (HCC): Status: ACTIVE | Noted: 2019-04-01

## 2022-09-27 ENCOUNTER — OFFICE VISIT (OUTPATIENT)
Dept: ORTHOPEDIC SURGERY | Age: 42
End: 2022-09-27

## 2022-09-27 VITALS — TEMPERATURE: 97.8 F | OXYGEN SATURATION: 97 % | HEART RATE: 114 BPM | WEIGHT: 315 LBS | BODY MASS INDEX: 49.87 KG/M2

## 2022-09-27 DIAGNOSIS — M25.571 CHRONIC PAIN OF RIGHT ANKLE: ICD-10-CM

## 2022-09-27 DIAGNOSIS — S86.011S PARTIAL ACHILLES TENDON TEAR, RIGHT, SEQUELA: ICD-10-CM

## 2022-09-27 DIAGNOSIS — M65.28 CALCIFIC ACHILLES TENDINITIS OF RIGHT LOWER EXTREMITY: Primary | ICD-10-CM

## 2022-09-27 DIAGNOSIS — M72.2 PLANTAR FASCIITIS OF RIGHT FOOT: ICD-10-CM

## 2022-09-27 DIAGNOSIS — G89.29 CHRONIC PAIN OF RIGHT ANKLE: ICD-10-CM

## 2022-09-27 PROCEDURE — 73610 X-RAY EXAM OF ANKLE: CPT | Performed by: ORTHOPAEDIC SURGERY

## 2022-09-27 PROCEDURE — 99203 OFFICE O/P NEW LOW 30 MIN: CPT | Performed by: ORTHOPAEDIC SURGERY

## 2022-09-27 RX ORDER — LIDOCAINE 50 MG/G
PATCH TOPICAL
COMMUNITY
Start: 2022-08-18

## 2022-09-27 RX ORDER — CYCLOBENZAPRINE HCL 10 MG
10 TABLET ORAL
COMMUNITY
Start: 2022-09-26

## 2022-09-27 RX ORDER — LISINOPRIL 20 MG/1
20 TABLET ORAL DAILY
COMMUNITY
Start: 2022-09-25

## 2022-09-27 RX ORDER — SILDENAFIL 100 MG/1
TABLET, FILM COATED ORAL
COMMUNITY
Start: 2022-08-10

## 2022-09-27 RX ORDER — INDOMETHACIN 50 MG/1
CAPSULE ORAL
COMMUNITY
Start: 2022-09-05

## 2022-09-27 RX ORDER — OXYCODONE AND ACETAMINOPHEN 10; 325 MG/1; MG/1
TABLET ORAL
COMMUNITY
Start: 2022-08-09 | End: 2022-09-27 | Stop reason: CLARIF

## 2022-09-27 RX ORDER — TIZANIDINE 4 MG/1
TABLET ORAL
COMMUNITY
Start: 2022-09-25

## 2022-09-27 NOTE — PATIENT INSTRUCTIONS
Runner's stretch with gentle push-off, 10 reps daily in the mornings     Use ice in a cylindrical shape (e.g. a frozen water bottle, a chilled soda can) on your plantar fascia as needed for pain relief.     Jalen Knowles ankle sleeve    Rocker bottom shoes:  Gabriel [employer] U[employer]  MBT  Running[employer] Et[employer] ([facility])  [facility] (Lala Anderson)

## 2023-08-03 ENCOUNTER — HOSPITAL ENCOUNTER (OUTPATIENT)
Facility: HOSPITAL | Age: 43
Setting detail: RECURRING SERIES
Discharge: HOME | End: 2023-08-06
Payer: COMMERCIAL

## 2023-08-03 PROCEDURE — 97161 PT EVAL LOW COMPLEX 20 MIN: CPT

## 2023-08-03 NOTE — PROGRESS NOTES
PT DAILY TREATMENT NOTE/COMBO EVAL       Patient Name: Delmy Chinchilla    Date: 8/3/2023    : 1980  Insurance: Payor: Chele / Plan: EXP A[employer] BETTER [facility] / Product Type: *No Product type* /      Patient  verified yes     Visit #   Current / Total 1 10   Time   In / Out 2:45 3:35   Pain   In / Out 4 4   Subjective Functional Status/Changes: See POC   Changes to:  Meds, Allergies, Med Hx, Sx Hx? If yes, update Summary List yes, see POC     Treatment Area: Left wrist pain [M25.532]    SUBJECTIVE    CC: left wrist pain  History/Mechanism of Injury: woke up one morning and got out of bed, hand slipped, heard popping sound  Radiographs negative  Current Symptoms/Complaints: dorsal wrist pain - sharp pain at ulnar aspect,   Pain difficulty extending wrist  Pain-  Current: 4/10     Worst: 10/10   Best: 0/10  Aggravated By: extending wrist, bearing weight through hand, pushing/pulling  Alleviated By: prednisone, ibuprofen, distracted  Previous Treatment/Compliance: prednisone,   PMHx/Surgical Hx: gout, asthma, HTN  Work Hx: sweeping, raking, trash cans, lifting, carrying  PLOF: functionally independent, right handed  Limitations to PLOF: pain with weight bearing, pain with extension  Pt Goals: stop hurting    OBJECTIVE/EXAMINATION    25 min [x]Eval  - untimed                 Therapeutic Procedures: Tx Min Billable or 1:1 Min (if diff from Tx Min) Procedure, Rationale, Specifics   20  36758 Therapeutic Exercise (timed):  increase ROM, strength, coordination, balance, and proprioception to improve patient's ability to progress to PLOF and address remaining functional goals.  (see flow sheet as applicable)     Details if applicable:     5  00970 Therapeutic Activity (timed):  use of dynamic activities replicating functional movements to increase ROM, strength, coordination, balance, and proprioception in order to improve patient's ability to progress to PLOF and address remaining
extension strength of 5/5 to improve gripping objects. Status at last note/certification: 3-/5  - Goal: Pt to report no more than 2/10 pain at worst to improve ease with work activities. Status at last note/certification: 0/23  - Goal: Pt to report FOTO score of at least 65 pts to show improved function and quality of life. Status at last note/certification: FOTO 50 pts       Frequency / Duration: Patient would benefit from skilled PT 2 times per week for up to 36 sessions as needed in this certification period. Goals will be assigned and reassessed every 10 visits/ 30 days per guidelines . Patient/ Caregiver education and instruction: Diagnosis, prognosis, self care, activity modification, and exercises [x]  Plan of care has been reviewed with LUIS EDUARDO Leon, PT       8/3/2023       3:51 PM  ===================================================================  I certify that the above Therapy Services are being furnished while the patient is under my care. I agree with the treatment plan and certify that this therapy is necessary. Physician's Signature:_________________________   DATE:_________   TIME:________                           MELODY Roblero *    ** Signature, Date and Time must be completed for valid certification **  [facility] and [facility] or you may fax the signed copy to [phone]. Thank you.

## 2023-08-29 ENCOUNTER — APPOINTMENT (OUTPATIENT)
Facility: HOSPITAL | Age: 43
End: 2023-08-29
Payer: COMMERCIAL

## 2023-09-01 ENCOUNTER — HOSPITAL ENCOUNTER (OUTPATIENT)
Facility: HOSPITAL | Age: 43
Setting detail: RECURRING SERIES
Discharge: HOME | End: 2023-09-04
Payer: COMMERCIAL

## 2023-09-01 PROCEDURE — 97110 THERAPEUTIC EXERCISES: CPT

## 2023-09-01 PROCEDURE — 97112 NEUROMUSCULAR REEDUCATION: CPT

## 2023-09-01 PROCEDURE — 97530 THERAPEUTIC ACTIVITIES: CPT

## 2023-09-01 NOTE — PROGRESS NOTES
PHYSICAL / OCCUPATIONAL THERAPY - DAILY TREATMENT NOTE (updated )    Patient Name: Black Alarcon    Date: 2023    : 1980  Insurance: Payor: Chele / Plan: EXP [employer] BETTER [facility] 4[facility] / Product Type: *No Product type* /      Patient  verified Yes     Visit #   Current / Total 2 10   Time   In / Out 2:21 pm 3:00 pm   Pain   In / Out 0 0   Subjective Functional Status/Changes: \"It's gotten better. I have been exercising at home. I am back at work, using it and it's been feeling better already. \"     TREATMENT AREA =  Left wrist pain [M25.532]    OBJECTIVE  Therapeutic Procedures: Tx Min Billable or 1:1 Min (if diff from Tx Min) Procedure, Rationale, Specifics   21  54950 Therapeutic Exercise (timed):  increase ROM, strength, coordination, balance, and proprioception to improve patient's ability to progress to PLOF and address remaining functional goals. (see flow sheet as applicable)     Details if applicable:  reassessment     10 10 02737 Neuromuscular Re-Education (timed):  improve balance, coordination, kinesthetic sense, posture, core stability and proprioception to improve patient's ability to develop conscious control of individual muscles and awareness of position of extremities in order to progress to PLOF and address remaining functional goals. (see flow sheet as applicable)     Details if applicable:  ball drops/catch, eccentric pronation and supination     8 8 91738 Therapeutic Activity (timed):  use of dynamic activities replicating functional movements to increase ROM, strength, coordination, balance, and proprioception in order to improve patient's ability to progress to PLOF and address remaining functional goals.   (see flow sheet as applicable)     Details if applicable:  gripping, opposition            Details if applicable:     [phone] Reminder: bill using total billable min of TIMED therapeutic procedures (example: do not include dry needle

## 2023-09-01 NOTE — PROGRESS NOTES
Physical Therapy Discharge Instructions      [facility] - [address] [place] [address] 20  [phone] fax      Patient: Alexa Stephens  : 1980      Continue Home Exercise Program 1 times per day for 4 weeks:      Continue with    [x] Ice  as needed       Follow up with MD:     [x] As needed      Recommendations:     [x]   Return to activity with home program        Additional Comments: It has been a pleasure working with you and meeting you. I hope that you continue to progress with your recovery. Please feel free to contact us regarding any questions you may have in the future.         Wallace Mederos, PTA  2023

## 2023-09-05 ENCOUNTER — APPOINTMENT (OUTPATIENT)
Facility: HOSPITAL | Age: 43
End: 2023-09-05
Payer: COMMERCIAL

## 2023-09-05 NOTE — PROGRESS NOTES
[address] [facility],[address] [facility]   [address] [facility] [address] [facility] [place], [address]  Phone: [phone] Fax [phone] SUMMARY  Patient Name: Criss Guzmán : 1980   Treatment/Medical Diagnosis: Left wrist pain [M25.532]   Referral Source: MELODY Jansen *     Date of Initial Visit: 8/3/23 Attended Visits: 2 Missed Visits: 2     SUMMARY OF TREATMENT  Pt is a pleasant 37 y.o. male who presents with c/o left wrist pain. The patient reports an acute onset of left wrist pain in 2022 after his left hand slipped on his bed when he was pushing off to  the early morning; his hand twisted and he heard a popping sound, immediately followed by sharp pain at ulnar aspect of left wrist. Treatment has consisted of the followin Therapeutic Exercise, 69657 Neuromuscular Re-Education, and 81841 Therapeutic Activity. CURRENT STATUS  Patient has made great progress in PT, despite lack of regular attendance due to awaiting insurance authorization and busy work schedule. Patient reports return to work activities with less pain, possibly due to improved flexibility and strength. Assessment as follows:    UE AROM:    Right (/5) Left (/5)   Wrist  Flexion 65 67             Extension 50 40          Ulnar Deviation 38 32        Radial Deviation 20 15      Supination right  76, left 90 deg  Pronation right 69, left 80 deg      strength: (R) 130 lbs max, (L) 95 lbs max        Improvements: back to work, reaching, lifting, flexibility/mobility  Pain: (B) 0, (W) 5/10 when opening door with bags of groceries on left antebrachium       Long Term Goals: To be accomplished in 10 treatments  - Goal: Pt to demo left  strength of at least 80 psi over three trials to improve ease with gripping items at work.   Status at last note/certification: 35 psi  Current: goal met 95 psi  - Goal: Pt to demo left wrist extension AROM of at least 50 deg to improve ease with

## 2023-09-08 ENCOUNTER — APPOINTMENT (OUTPATIENT)
Facility: HOSPITAL | Age: 43
End: 2023-09-08
Payer: COMMERCIAL

## 2023-09-12 ENCOUNTER — APPOINTMENT (OUTPATIENT)
Facility: HOSPITAL | Age: 43
End: 2023-09-12
Payer: COMMERCIAL

## 2023-09-14 ENCOUNTER — APPOINTMENT (OUTPATIENT)
Facility: HOSPITAL | Age: 43
End: 2023-09-14
Payer: COMMERCIAL

## 2023-09-19 ENCOUNTER — APPOINTMENT (OUTPATIENT)
Facility: HOSPITAL | Age: 43
End: 2023-09-19
Payer: COMMERCIAL

## 2023-09-22 ENCOUNTER — APPOINTMENT (OUTPATIENT)
Facility: HOSPITAL | Age: 43
End: 2023-09-22
Payer: COMMERCIAL

## 2023-09-26 ENCOUNTER — APPOINTMENT (OUTPATIENT)
Facility: HOSPITAL | Age: 43
End: 2023-09-26
Payer: COMMERCIAL

## 2023-09-29 ENCOUNTER — APPOINTMENT (OUTPATIENT)
Facility: HOSPITAL | Age: 43
End: 2023-09-29
Payer: COMMERCIAL

## 2024-02-09 ENCOUNTER — OFFICE VISIT (OUTPATIENT)
Age: 44
End: 2024-02-09

## 2024-02-09 VITALS — BODY MASS INDEX: 41.75 KG/M2 | HEIGHT: 73 IN | WEIGHT: 315 LBS

## 2024-02-09 DIAGNOSIS — M77.8 EXTENSOR CARPI ULNARIS TENDINITIS: ICD-10-CM

## 2024-02-09 DIAGNOSIS — M19.032 PRIMARY OSTEOARTHRITIS OF LEFT WRIST: Primary | ICD-10-CM

## 2024-02-09 RX ORDER — LIDOCAINE HYDROCHLORIDE 10 MG/ML
0.5 INJECTION, SOLUTION INFILTRATION; PERINEURAL ONCE
Status: COMPLETED | OUTPATIENT
Start: 2024-02-09 | End: 2024-02-09

## 2024-02-09 RX ADMIN — LIDOCAINE HYDROCHLORIDE 0.5 ML: 10 INJECTION, SOLUTION INFILTRATION; PERINEURAL at 11:01

## 2024-02-09 NOTE — PROGRESS NOTES
was reviewed with patient, who verbalized agreement and understanding of the plan    [facility] - [place]  [address], [address]  [facility] [address]   OFFICE PROCEDURE PROGRESS NOTE        Chart reviewed for the following:   Anibal BARNES DO, have reviewed the History, Physical and updated the Allergic reactions for Rylan Black     TIME OUT performed immediately prior to start of procedure:   Anibal BARNES DO, have performed the following reviews on Rylan Black prior to the start of the procedure:            * Patient was identified by name and date of birth   * Agreement on procedure being performed was verified  * Risks and Benefits explained to the patient  * Procedure site verified and marked as necessary  * Patient was positioned for comfort  * Consent was signed and verified     Time: 10:59 AM      Date of procedure: 2/9/2024    Procedure performed by:  Anibal Snow DO    Provider assisted by: Zari Kim MA    Patient assisted by: self    How tolerated by patient: tolerated    Post Procedural Pain Scale:0    Comments: none    Procedure:  After consent was obtained, using sterile technique the left wrist was prepped. Local anesthetic used: 1% Lidocaine Kenalog 5 mg and was then injected and the needle withdrawn.  The procedure was well tolerated.  The patient is asked to continue to rest the area for a few more days before resuming regular activities.  It may be more painful for the first 1-2 days.  Watch for fever, or increased swelling or persistent pain in the joint. Call or return to clinic prn if such symptoms occur or there is failure to improve as anticipated.     Note: This note was completed using voice recognition software.  Any typographical/name errors or mistakes are unintentional.

## 2025-06-11 ENCOUNTER — TELEPHONE (OUTPATIENT)
Age: 45
End: 2025-06-11

## 2025-06-11 NOTE — TELEPHONE ENCOUNTER
Patient stated that he had an appt schedule for 6/12 and it had to be reschedule to 9/3, Patient stated that he has been waiting on the appt for a while for the back pain.Patient has requested a sooner appt    Please advise patient @ [phone]